# Patient Record
Sex: FEMALE | Race: WHITE | NOT HISPANIC OR LATINO | Employment: PART TIME | URBAN - NONMETROPOLITAN AREA
[De-identification: names, ages, dates, MRNs, and addresses within clinical notes are randomized per-mention and may not be internally consistent; named-entity substitution may affect disease eponyms.]

---

## 2017-01-31 ENCOUNTER — OFFICE VISIT (OUTPATIENT)
Dept: NEUROSURGERY | Facility: CLINIC | Age: 32
End: 2017-01-31

## 2017-01-31 DIAGNOSIS — M51.26 LUMBAR DISC HERNIATION: ICD-10-CM

## 2017-01-31 DIAGNOSIS — M51.36 DDD (DEGENERATIVE DISC DISEASE), LUMBAR: Primary | ICD-10-CM

## 2017-01-31 PROCEDURE — 99243 OFF/OP CNSLTJ NEW/EST LOW 30: CPT | Performed by: NEUROLOGICAL SURGERY

## 2017-01-31 RX ORDER — NAPROXEN 500 MG/1
TABLET ORAL
Refills: 0 | COMMUNITY
Start: 2016-11-30

## 2017-01-31 RX ORDER — CITALOPRAM 10 MG/1
TABLET ORAL
Refills: 1 | COMMUNITY
Start: 2016-12-19

## 2017-01-31 RX ORDER — HYDROXYZINE PAMOATE 25 MG/1
CAPSULE ORAL
Refills: 0 | COMMUNITY
Start: 2016-11-04

## 2017-01-31 NOTE — LETTER
January 31, 2017     Morales Springer MD  [address] [place] [address] Rd  [place] [address]    Patient: Ophelia Loza   YOB: 1985   Date of Visit: 1/31/2017       Dear Dr. Gian MD:    Thank you for referring Ophelia Loza to me for evaluation. Below is a copy of my consult note.    If you have questions, please do not hesitate to call me. I look forward to following Ophelia along with you.         Sincerely,        Ghanshyam Judd MD        CC: No Recipients    Subjective   Patient ID: Ophelia Loza is a 31 y.o. female is being seen for consultation today at the request of Morales Springer MD  Chief Complaint: Back pain    History of Present Illness: The patient is a 31-year-old woman who had acute onset of back pain in early December, severe for a couple of weeks, and steadily improving since then.  She was treated with chiropractic therapy and has had some success with the treatment.  She has a long-standing history of about 8 years of chronic back pain with intermittent chiropractic therapy.  Numerous physical activities bother her back.  She does not have a unilateral radicular pain or numbness to either lower extremity.  She works as a .    Review of Radiographic Studies:  Lumbar MRI scan on 12/20/16 shows a markedly narrowed disc space at L4 5 with a left paracentral disc herniation of small size it appears to be relatively acute and probably the source of her more recent pain.  There is a mild degenerative disc at L3 4.    The following portions of the patient's history were reviewed, updated as appropriate and approved: allergies, current medications, past family history, past medical history, past social history, past surgical history, review of systems and problem list.  Review of Systems   Constitutional: Negative for activity change, appetite change, chills, diaphoresis, fatigue, fever and unexpected weight change.   HENT: Negative for congestion, dental problem,  drooling, ear discharge, ear pain, facial swelling, hearing loss, mouth sores, nosebleeds, postnasal drip, rhinorrhea, sinus pressure, sneezing, sore throat, tinnitus, trouble swallowing and voice change.    Eyes: Negative for photophobia, pain, discharge, redness, itching and visual disturbance.   Respiratory: Negative for apnea, cough, choking, chest tightness, shortness of breath, wheezing and stridor.    Cardiovascular: Negative for chest pain, palpitations and leg swelling.   Gastrointestinal: Negative for abdominal distention, abdominal pain, anal bleeding, blood in stool, constipation, diarrhea, nausea, rectal pain and vomiting.   Endocrine: Negative for cold intolerance, heat intolerance, polydipsia, polyphagia and polyuria.   Genitourinary: Negative for decreased urine volume, difficulty urinating, dysuria, enuresis, flank pain, frequency, genital sores, hematuria and urgency.   Musculoskeletal: Positive for arthralgias, back pain and myalgias. Negative for gait problem, joint swelling, neck pain and neck stiffness.   Skin: Negative for color change, pallor, rash and wound.   Allergic/Immunologic: Negative for environmental allergies, food allergies and immunocompromised state.   Neurological: Negative for dizziness, tremors, seizures, syncope, facial asymmetry, speech difficulty, weakness, light-headedness, numbness and headaches.   Hematological: Negative for adenopathy. Does not bruise/bleed easily.   Psychiatric/Behavioral: Negative for agitation, behavioral problems, confusion, decreased concentration, dysphoric mood, hallucinations, self-injury, sleep disturbance and suicidal ideas. The patient is not nervous/anxious and is not hyperactive.    All other systems reviewed and are negative.      Objective     NEUROLOGICAL EXAMINATION:      MENTAL STATUS:  Alert and oriented.  Speech intact.  Recent and remote memory intact.      CRANIAL NERVES:  Cranial nerve II:  Visual fields are full to  confrontation.  Cranial nerves III, IV and VI:  PERRLADC.  Extraocular movements are intact.  Nystagmus is not present.  Cranial nerve V:  Facial sensation is intact to light touch.  Cranial nerve VII:  Muscles of facial expression reveal no asymmetry.  Cranial nerve VIII:  Hearing is intact to finger rub bilaterally.  Cranial nerves IX and X:  Palate elevates symmetrically.  Cranial nerve XI:  Shoulder shrug is intact.  Cranial nerve XII:  Tongue is midline without evidence of atrophy or fasciculation.    MUSCULOSKELETAL:  SLR negative. Dale's test negative.    MOTOR:  Deltoid, biceps, triceps, and  strength intact.  No hand atrophy.  Hip flexion, knee extension, ankle dorsiflexion and plantar flexion intact. No tremor, spasticity, ataxia, or dysmetria.    SENSATION: Intact to touch upper and lower extremities.  Position sense intact.    REFLEXES:  DTR intact and symmetrical in upper and lower extremities. Negative Babinski bilaterally    Assessment   Left paracentral disc herniation L4 5 with resolving back pain syndrome and no clinically symptomatic radiculopathy.       Plan   Her treatment has been adequate and appropriate for her condition.  Surgery is not necessary.  Recurrences of pain can be expected with this degenerative disc.  Prevention with proper back mechanics would be helpful.        Ghanshyam Judd MD

## 2017-01-31 NOTE — PROGRESS NOTES
Subjective   Patient ID: Ophelia Loza is a 31 y.o. female is being seen for consultation today at the request of Morales Springer MD  Chief Complaint: Back pain    History of Present Illness: The patient is a 31-year-old woman who had acute onset of back pain in early December, severe for a couple of weeks, and steadily improving since then.  She was treated with chiropractic therapy and has had some success with the treatment.  She has a long-standing history of about 8 years of chronic back pain with intermittent chiropractic therapy.  Numerous physical activities bother her back.  She does not have a unilateral radicular pain or numbness to either lower extremity.  She works as a .    Review of Radiographic Studies:  Lumbar MRI scan on 12/20/16 shows a markedly narrowed disc space at L4 5 with a left paracentral disc herniation of small size it appears to be relatively acute and probably the source of her more recent pain.  There is a mild degenerative disc at L3 4.    The following portions of the patient's history were reviewed, updated as appropriate and approved: allergies, current medications, past family history, past medical history, past social history, past surgical history, review of systems and problem list.  Review of Systems   Constitutional: Negative for activity change, appetite change, chills, diaphoresis, fatigue, fever and unexpected weight change.   HENT: Negative for congestion, dental problem, drooling, ear discharge, ear pain, facial swelling, hearing loss, mouth sores, nosebleeds, postnasal drip, rhinorrhea, sinus pressure, sneezing, sore throat, tinnitus, trouble swallowing and voice change.    Eyes: Negative for photophobia, pain, discharge, redness, itching and visual disturbance.   Respiratory: Negative for apnea, cough, choking, chest tightness, shortness of breath, wheezing and stridor.    Cardiovascular: Negative for chest pain, palpitations and leg swelling.    Gastrointestinal: Negative for abdominal distention, abdominal pain, anal bleeding, blood in stool, constipation, diarrhea, nausea, rectal pain and vomiting.   Endocrine: Negative for cold intolerance, heat intolerance, polydipsia, polyphagia and polyuria.   Genitourinary: Negative for decreased urine volume, difficulty urinating, dysuria, enuresis, flank pain, frequency, genital sores, hematuria and urgency.   Musculoskeletal: Positive for arthralgias, back pain and myalgias. Negative for gait problem, joint swelling, neck pain and neck stiffness.   Skin: Negative for color change, pallor, rash and wound.   Allergic/Immunologic: Negative for environmental allergies, food allergies and immunocompromised state.   Neurological: Negative for dizziness, tremors, seizures, syncope, facial asymmetry, speech difficulty, weakness, light-headedness, numbness and headaches.   Hematological: Negative for adenopathy. Does not bruise/bleed easily.   Psychiatric/Behavioral: Negative for agitation, behavioral problems, confusion, decreased concentration, dysphoric mood, hallucinations, self-injury, sleep disturbance and suicidal ideas. The patient is not nervous/anxious and is not hyperactive.    All other systems reviewed and are negative.      Objective     NEUROLOGICAL EXAMINATION:      MENTAL STATUS:  Alert and oriented.  Speech intact.  Recent and remote memory intact.      CRANIAL NERVES:  Cranial nerve II:  Visual fields are full to confrontation.  Cranial nerves III, IV and VI:  PERRLADC.  Extraocular movements are intact.  Nystagmus is not present.  Cranial nerve V:  Facial sensation is intact to light touch.  Cranial nerve VII:  Muscles of facial expression reveal no asymmetry.  Cranial nerve VIII:  Hearing is intact to finger rub bilaterally.  Cranial nerves IX and X:  Palate elevates symmetrically.  Cranial nerve XI:  Shoulder shrug is intact.  Cranial nerve XII:  Tongue is midline without evidence of atrophy or  fasciculation.    MUSCULOSKELETAL:  SLR negative. Dale's test negative.    MOTOR:  Deltoid, biceps, triceps, and  strength intact.  No hand atrophy.  Hip flexion, knee extension, ankle dorsiflexion and plantar flexion intact. No tremor, spasticity, ataxia, or dysmetria.    SENSATION: Intact to touch upper and lower extremities.  Position sense intact.    REFLEXES:  DTR intact and symmetrical in upper and lower extremities. Negative Babinski bilaterally    Assessment   Left paracentral disc herniation L4 5 with resolving back pain syndrome and no clinically symptomatic radiculopathy.       Plan   Her treatment has been adequate and appropriate for her condition.  Surgery is not necessary.  Recurrences of pain can be expected with this degenerative disc.  Prevention with proper back mechanics would be helpful.        Ghanshyam Judd MD

## 2017-01-31 NOTE — PATIENT INSTRUCTIONS
Degenerative Disk Disease  Degenerative disk disease is a condition caused by the changes that occur in spinal disks as you grow older. Spinal disks are soft and compressible disks located between the bones of your spine (vertebrae). These disks act like shock absorbers. Degenerative disk disease can affect the whole spine. However, the neck and lower back are most commonly affected. Many changes can occur in the spinal disks with aging, such as:  · The spinal disks may dry and shrink.  · Small tears may occur in the tough, outer covering of the disk (annulus).  · The disk space may become smaller due to loss of water.  · Abnormal growths in the bone (spurs) may occur. This can put pressure on the nerve roots exiting the spinal canal, causing pain.  · The spinal canal may become narrowed.  RISK FACTORS   · Being overweight.  · Having a family history of degenerative disk disease.  · Smoking.  · There is increased risk if you are doing heavy lifting or have a sudden injury.  SIGNS AND SYMPTOMS   Symptoms vary from person to person and may include:  · Pain that varies in intensity. Some people have no pain, while others have severe pain. The location of the pain depends on the part of your backbone that is affected.  ¨ You will have neck or arm pain if a disk in the neck area is affected.  ¨ You will have pain in your back, buttocks, or legs if a disk in the lower back is affected.  · Pain that becomes worse while bending, reaching up, or with twisting movements.  · Pain that may start gradually and then get worse as time passes. It may also start after a major or minor injury.  · Numbness or tingling in the arms or legs.  DIAGNOSIS   Your health care provider will ask you about your symptoms and about activities or habits that may cause the pain. He or she may also ask about any injuries, diseases, or treatments you have had. Your health care provider will examine you to check for the range of movement that is  possible in the affected area, to check for strength in your extremities, and to check for sensation in the areas of the arms and legs supplied by different nerve roots. You may also have:   · An X-ray of the spine.  · Other imaging tests, such as MRI.  TREATMENT   Your health care provider will advise you on the best plan for treatment. Treatment may include:  · Medicines.  · Rehabilitation exercises.  HOME CARE INSTRUCTIONS   · Follow proper lifting and walking techniques as advised by your health care provider.  · Maintain good posture.  · Exercise regularly as advised by your health care provider.  · Perform relaxation exercises.  · Change your sitting, standing, and sleeping habits as advised by your health care provider.  · Change positions frequently.  · Lose weight or maintain a healthy weight as advised by your health care provider.  · Do not use any tobacco products, including cigarettes, chewing tobacco, or electronic cigarettes. If you need help quitting, ask your health care provider.  · Wear supportive footwear.  · Take medicines only as directed by your health care provider.  SEEK MEDICAL CARE IF:   · Your pain does not go away within 1-4 weeks.  · You have significant appetite or weight loss.  SEEK IMMEDIATE MEDICAL CARE IF:   · Your pain is severe.  · You notice weakness in your arms, hands, or legs.  · You begin to lose control of your bladder or bowel movements.  · You have fevers or night sweats.  MAKE SURE YOU:   · Understand these instructions.  · Will watch your condition.  · Will get help right away if you are not doing well or get worse.     This information is not intended to replace advice given to you by your health care provider. Make sure you discuss any questions you have with your health care provider.     Document Released: 10/14/2008 Document Revised: 01/08/2016 Document Reviewed: 04/21/2015  [employer] Education ©2016 [employer]    Herniated Disk With  Rehab  Between each vertebrae of the spine exists a disk. These disks contain a jelly-like material that helps cushion the spinal column. Occasionally, damage to the supportive ligaments of the vertebrae causes a disk to shift from its normal alignment and place pressure on surrounding structures, such as the spinal cord. This is called a herniated (ruptured) disk.  SYMPTOMS   · Pain in the back, that often affects one side.  · Pain that gets worse with movement, sneezing, coughing, or straining.  · Muscle spasms in the back.  · Pain, numbness, or weakness affecting one arm or leg (depending on whether injury is in the neck or low back).  · Muscle loss (if the condition has become chronic).  · Loss of stool (bowel) or urine (bladder) function.  CAUSES   Herniated disks result when a disk becomes weak. The disk eventually ruptures and places pressure on the spinal cord. Herniated disks may occur from sudden injury (acute trauma) such as heavy labor, or from ongoing (chronic) stress, such as obesity.   RISK INCREASES WITH:  · Sports that involve downward or twisting pressure on the neck or spine (football, weightlifting, horseback riding competition, bowling, tennis, jogging, track, racquetball, gymnastics).  · Poor strength and flexibility.  · Failure to warm up properly before activity.  · Family history of low back pain or disk disorders.  · Previous back surgery (especially fusion).  · Preexisting forward displacement of a vertebra (spondylolisthesis).  · Poor technique when lifting.  · Prolonged sitting, especially with poor posture.  PREVENTION  · Learn and use proper technique when sitting or lifting.  · Warm up and stretch properly before activity.  · Maintain physical fitness:    Strength, flexibility, and endurance.    Cardiovascular fitness.    Maintain a healthy body weight.  · If previously injured, avoid any intense physical activity that requires twisting of the body under uncontrollable  conditions.  PROGNOSIS   If treated properly, herniated disks are usually curable within 6 weeks. Sometimes, surgery is required.   RELATED COMPLICATIONS   · Permanent numbness, weakness, or paralysis and muscle loss.  · Chronic back pain.  · Loss of bowel or bladder function.  · Decreased sexual function.  · Risks of surgery: infection, bleeding, injury to nerves (persistent or increased numbness, weakness, or paralysis), persistent back pain, and spinal headache.  TREATMENT  Treatment first involves resting from any aggravating activities and the use of ice and medicine to reduce pain and inflammation. As muscle spasms begin to decrease, it is important to perform strengthening and stretching exercises of the back muscles. These will help teach and reinforce proper body posture. These exercises may be performed at home, or with a therapist. A therapist may complete a further evaluation and recommend additional treatments, such as ultrasound, traction (for herniated disks of the neck), a cervical collar (for herniated disks of the neck), or a corset or back brace (for herniated disks of the low back). Prolonged rest may do more harm than good. Your therapist will teach you proper techniques for performing simple activities, such as lifting an object off the floor or using proper posture while sitting. At night, it is advised that you sleep on your back, on a firm mattress, and place a pillow under your knees. Your caregiver may recommend oral steroids or an injection of corticosteroids in the space around the spinal cord (epidural space) in order to reduce pain and inflammation. For severe cases, surgery is recommended.  MEDICATION   · If pain medicine is needed, nonsteroidal anti-inflammatory medicines (aspirin and ibuprofen), or other minor pain relievers (acetaminophen), are often advised.  · Do not take pain medicine for 7 days before surgery.  · Prescription pain relievers may be given if your caregiver thinks  they are needed. Use only as directed and only as much as you need.  · Ointments applied to the skin may be helpful.  · Corticosteroid injections may be given. These injections should be reserved for the most serious cases, as they can only be given a certain number of times.  · Oral steroids may be given to reduce inflammation, although not usually for severe (acute) injuries.  HEAT AND COLD  · Cold treatment (icing) relieves pain and reduces inflammation. Cold treatment should be applied for 10 to 15 minutes every 2 to 3 hours, and immediately after activity that aggravates your symptoms. Use ice packs or an ice massage.  · Heat treatment may be used before performing stretching and strengthening activities prescribed by your caregiver, physical therapist, or . Use a heat pack or a warm water soak.  SEEK MEDICAL CARE IF:   · Symptoms get worse or do not improve in 2 to 4 weeks, despite treatment.  · You develop loss of bowel or bladder function.  · New, unexplained symptoms develop. (Drugs used in treatment may produce side effects.)  EXERCISES   RANGE OF MOTION (ROM) AND STRETCHING EXERCISES - Herniated Disk (Ruptured Disk)  Most people with low back pain will find that their symptoms get worse with excessive bending forward (flexion) or arching at the low back (extension). The exercises that will help resolve your symptoms will focus on the opposite motion. Your physician, physical therapist or  will help you determine which exercises will be most helpful to resolve your low back pain. Do not complete any exercises without first consulting with your caregiver. Discontinue any exercises that make your symptoms worse, until you speak to your caregiver. If you have pain, numbness or tingling that travels down into your buttocks, leg or foot, the goal of this therapy is for these symptoms to move closer to your back and to eventually go away. Sometimes, these leg symptoms will get  better, but your low back pain may get worse. This is typically an indication of progress in your rehabilitation. Be sure to be very alert to any changes in your symptoms and to the activities you have done in the 24 hours prior to the change. Sharing this information with your caregiver will allow him or her to best treat your condition.  These exercises may help you when beginning to rehabilitate your injury. Your symptoms may go away with or without further involvement from your physician, physical therapist or . While completing these exercises, remember:   · Restoring tissue flexibility helps normal motion to return to the joints. This allows healthier, less painful movement and activity.  · An effective stretch should be held for at least 30 seconds.  · A stretch should never be painful. You should only feel a gentle lengthening or release in the stretched tissue.  FLEXION RANGE OF MOTION AND STRETCHING EXERCISES:  STRETCH - Flexion, Single Knee to Chest  · Lie on a firm bed or floor, with both legs extended in front of you.  · Keeping one leg in contact with the floor, bring your opposite knee to your chest. Hold your leg in place by either grabbing behind your thigh or at your knee.  · Pull until you feel a gentle stretch in your low back. Hold for __________ seconds.  · Slowly release your grasp and repeat the exercise with the opposite side.  Repeat __________ times. Complete this exercise __________ times per day.   STRETCH - Flexion, Double Knee to Chest   · Lie on a firm bed or floor, with both legs extended in front of you.  · Keeping one leg in contact with the floor, bring your opposite knee to your chest.  · Tense your stomach muscles to support your back and then lift your other knee to your chest. Hold your legs in place by either grabbing behind your thighs or at your knees.  · Pull both knees toward your chest until you feel a gentle stretch in your low back. Hold for __________  seconds.  · Tense your stomach muscles and slowly return one leg at a time to the floor.  Repeat __________ times. Complete this exercise __________ times per day.   STRETCH - Low Trunk Rotation  · Lie on a firm bed or floor. Keeping your legs in front of you, bend your knees so they are both pointed toward the ceiling and your feet are flat on the floor.  · Extend your arms out to the side. This will stabilize your upper body by keeping your shoulders in contact with the floor.  · Gently and slowly drop both knees together to one side, until you feel a gentle stretch in your low back. Hold for __________ seconds.  · Tense your stomach muscles to support your low back as you bring your knees back to the starting position. Repeat the exercise while dropping both knees to the other side.  Repeat __________ times. Complete this exercise __________ times per day   EXTENSION RANGE OF MOTION AND FLEXIBILITY EXERCISES:  STRETCH - Extension, Prone on Elbows   · Lie on your stomach on the floor. (A bed will be too soft.) Place your palms about shoulder width apart.  · Place your elbows under your shoulders. If this is too painful, stack pillows under your chest.  · Allow your body to relax so that your hips drop lower and make contact more completely with the floor.  · Hold this position for __________ seconds.  · Slowly return to lying flat on the floor.  Repeat __________ times. Complete this exercise __________ times per day.   RANGE OF MOTION - Extension, Prone Press Ups   · Lie on your stomach on the floor. (A bed will be too soft.) Place your palms about shoulder width apart and at the height of your head.  · Keeping your back as relaxed as possible, slowly straighten your elbows while keeping your hips on the floor. You may adjust the placement of your hands to maximize your comfort. As you gain motion, your hands will come more underneath your shoulders.  · Hold this position for __________ seconds.  · Slowly return  "to lying flat on the floor.  Repeat __________ times. Complete this exercise __________ times per day.   RANGE OF MOTION- Quadruped, Neutral Spine   · Assume a hands and knees position on a firm surface. Keep your hands under your shoulders and your knees under your hips. You may place padding under your knees for comfort.  · Drop your head and point your tail bone toward the ground below you. This will round out your low back like an angry cat. Hold this position for __________ seconds.  · Slowly lift your head and release your tail bone so that your back sags into a large arch, like an old horse.  · Hold this position for __________ seconds.  · Repeat this until you feel limber in your low back.  · Now, find your \"sweet spot.\" This will be the most comfortable position somewhere between the two previous positions. This is your neutral spine. Once you have found this position, tense your stomach muscles to support your low back.  · Hold this position for __________ seconds.  Repeat __________ times. Complete this exercise __________ times per day.   STRENGTHENING EXERCISES - Herniated Disk (Ruptured Disk)  These exercises may help you when beginning to rehabilitate your injury. These exercises should be done near your \"sweet spot.\" This is the neutral, low-back arch, somewhere between fully rounded and fully arched, that is your least painful position. When performed in this safe range of motion, these exercises can be used for people who have either a flexion or extension based injury. These exercises may resolve your symptoms with or without further involvement from your physician, physical therapist or . While completing these exercises, remember:   · Muscles can gain both the endurance and the strength needed for everyday activities through controlled exercises.  · Complete these exercises as instructed by your physician, physical therapist or . Increase the resistance and " repetitions only as guided.  · You may experience muscle soreness or fatigue, but the pain or discomfort you are trying to eliminate should never worsen during these exercises. If this pain does get worse, stop and make sure you are following the directions exactly. If the pain is still present after adjustments, discontinue the exercise until you can discuss the trouble with your clinician.  STRENGTHENING - Deep Abdominals, Pelvic Tilt   · Lie on a firm bed or floor. Keeping your legs in front of you, bend your knees so they are both pointed toward the ceiling and your feet are flat on the floor.  · Tense your lower abdominal muscles to press your low back into the floor. This motion will rotate your pelvis so that your tail bone is scooping upwards rather than pointing at your feet or into the floor.  · With a gentle tension and even breathing, hold this position for __________ seconds.  Repeat __________ times. Complete this exercise __________ times per day.   STRENGTHENING - Abdominals, Crunches   · Lie on a firm bed or floor. Keeping your legs in front of you, bend your knees so they are both pointed toward the ceiling and your feet are flat on the floor. Cross your arms over your chest.  · Slightly tip your chin down without bending your neck.  · Tense your abdominals and slowly lift your trunk high enough so that your shoulder blades are just off the floor. Lifting higher can put too much stress on the low back and does not further strengthen your abdominal muscles.  · With control, return to the starting position.  Repeat __________ times. Complete this exercise __________ times per day.   STRENGTHENING - Quadruped, Opposite UE/LE Lift  · Assume a hands and knees position on a firm surface. Keep your hands under your shoulders and your knees under your hips. You may place padding under your knees for comfort.  · Find your neutral spine and gently tense your abdominal muscles so that you can maintain this  position. Your shoulders and hips should form a rectangle that is parallel with the floor and is not twisted.  · Keeping your trunk steady, lift your right hand no higher than your shoulder. Then lift your left leg no higher than your hip. Make sure you are not holding your breath. Hold this position for __________ seconds.  · Continuing to keep your abdominal muscles tense and your back steady, slowly return to your starting position. Repeat with the opposite arm and leg.  Repeat __________ times. Complete this exercise __________ times per day.   STRENGTHENING - Lower Abdominals, Double Knee Lift  · Lie on a firm bed or floor. Keeping your legs in front of you, bend your knees so they are both pointed toward the ceiling and your feet are flat on the floor.  · Tense your abdominal muscles to brace your low back and slowly lift both of your knees until they come over your hips. Be certain not to hold your breath.  · Hold for __________ seconds. Using your abdominal muscles, return to the starting position in a slow and controlled manner.  Repeat __________ times. Complete this exercise __________ times per day.   POSTURE AND BODY MECHANICS CONSIDERATIONS - Herniated Disc (Ruptured Disk)  Keeping correct posture when sitting, standing or completing your activities will reduce the stress put on different body tissues, allowing injured tissues a chance to heal and limiting painful experiences. The following are general guidelines for improved posture. Your physician or physical therapist will provide you with any instructions specific to your needs. While reading these guidelines, remember:  · The exercises prescribed by your provider will help you build the flexibility and strength to maintain correct postures.  · The correct posture provides the best environment for your joints to work. All of your joints have less wear and tear when properly supported by a spine with good posture. This means you will experience a  healthier, less painful body.  · Correct posture must be practiced with all of your activities, especially prolonged sitting and standing. Correct posture is as important when doing repetitive low-stress activities (typing) as it is when doing a single heavy-load activity (lifting).  RESTING POSITIONS  Consider which positions are most painful for you when choosing a resting position. If you have pain with flexion-based activities (sitting, bending, stooping, squatting), choose a position that allows you to rest in a less flexed posture. You would want to avoid curling into a fetal position on your side. If your pain gets worse with extension-based activities (prolonged standing, working overhead), avoid resting in an extended position such as sleeping on your stomach. Most people will find more comfort when they rest with their spine in a more neutral position, neither too rounded nor too arched. Lying on a non-sagging bed on your side, with a pillow between your knees, or on your back with a pillow under your knees will often provide some relief. Keep in mind, being in any one position for a prolonged period of time, no matter how correct your posture, can still lead to stiffness.  PROPER SITTING POSTURE  In order to minimize stress and discomfort on your spine, you must sit with correct posture. Sitting with good posture should be effortless for a healthy body. Returning to good posture is a gradual process. Many people can work toward this most comfortably by using various supports until they have the flexibility and strength to maintain this posture on their own.  When sitting with proper posture, your ears will fall over your shoulders and your shoulders will fall over your hips. You should use the back of the chair to support your upper back. Your low back will be in a neutral position, just slightly arched. You may place a small pillow or folded towel at the base of your low back for support.   When working  at a desk, create an environment that supports good, upright posture. Without extra support, muscles tire, which leads to excessive strain on joints and other tissues. Keep these recommendations in mind.  CHAIR  · A chair should be able to slide under your desk when your back makes contact with the back of the chair. This allows you to work closely.  · The chair's height should allow your eyes to be level with the upper part of your monitor and your hands to be slightly lower than your elbows.  BODY POSITION  · Your feet should make contact with the floor. If this is not possible, use a foot rest.  · Keep your ears over your shoulders. This will reduce stress on your neck and low back.  INCORRECT SITTING POSTURES   If you are feeling tired and unable to assume a healthy sitting posture, do not slouch or slump. This puts excessive strain on your back tissues, causing more damage and pain. Healthier options include:  · Using more support, like a lumbar pillow.  · Switching tasks, to something that requires you to be upright or walking.  · Talking a brief walk.  · Lying down to rest in a neutral-spine position.  PROLONGED STANDING WHILE SLIGHTLY LEANING FORWARD   When completing a task that requires you to lean forward while standing in one place for a long time, place either foot up on a stationary 2-4 inch high object, to help maintain the best posture. When both feet are on the ground, the low back tends to lose its slight inward curve. If this curve flattens (or becomes too large), the back and your other joints will experience too much stress, tire more quickly and can cause pain.  CORRECT STANDING POSTURES  Proper standing posture should be assumed with all daily activities, even if they only take a few moments, like when brushing your teeth. As in sitting, your ears should fall over your shoulders and your shoulders should fall over your hips. You should keep a slight tension in your abdominal muscles to brace  your spine. Your tailbone should point down to the ground, not behind your body, resulting in an over-extended swayback posture.   INCORRECT STANDING POSTURES   Common incorrect standing postures include a forward head, locked knees or an excessive swayback.  WALKING  Walk with an upright posture. Your ears, shoulders and hips should all line-up.  PROLONGED ACTIVITY IN A FLEXED POSITION  When completing a task that requires you to bend forward at your waist or lean over a low surface, try finding a way to stabilize 3 out of 4 of your limbs. You can place a hand or elbow on your thigh, or rest a knee on the surface you are reaching across. This will provide you more stability so that your muscles do not tire as quickly. By keeping your knees relaxed, or slightly bent, you will also reduce stress across your low back.  CORRECT LIFTING TECHNIQUES  DO :   · Assume a wide stance. This will provide you more stability and the opportunity to get as close as possible to the object you are lifting.  · Tense your abdominals to brace your spine. Then, bend at the knees and hips. Keeping your back locked in a neutral-spine position, lift using your leg muscles. Lift with your legs, keeping your back straight.  · Test the weight of unknown objects before attempting to lift them.  · Try to keep your elbows down by your sides, in order get the best strength from your shoulders when carrying an object.  · Always ask for help when lifting heavy or awkward objects.  INCORRECT LIFTING TECHNIQUES  DO NOT:   · Lock your knees when lifting, even if it is a small object.  · Bend and twist. Pivot at your feet or move your feet when needing to change directions.  · Assume that you can safely  even a paper clip, without proper posture.     This information is not intended to replace advice given to you by your health care provider. Make sure you discuss any questions you have with your health care provider.     Document Released:  12/18/2006 Document Revised: 01/08/2016 Document Reviewed: 04/01/2010  [employer]vier Interactive Patient Education ©2016 [employer]      If you have any questions in regards to your visit with  today, please do not hesitate to contact our office. Ask to speak with a CMA for any clinical questions you may have.    Sarah Sparrow, [facility]    [phone]

## 2020-12-07 NOTE — PROGRESS NOTES
Patient: Ophelia Loza    YOB: 1985    Date: 12/09/2020    Primary Care Provider: Morales Springer MD    Chief Complaint   Patient presents with   • Abdominal Pain     RUQ pain       SUBJECTIVE:    History of present illness:  I saw the patient in the office today as a consultation for evaluation and treatment of gallstones. She states she has had sharp RUQ pain that radiates to her back. She states this has been going on for two weeks.  Patient has had 1 or 2 attacks today, pain mainly right upper quadrant radiates to her back and right shoulder.  Pain starts 1-1/2 to 2 hours after eating and last 1 to 2 hours.  No fever or chills.  No diarrhea or change in bowel habits.  No significant heartburn symptoms.    The following portions of the patient's history were reviewed and updated as appropriate: allergies, current medications, past family history, past medical history, past social history, past surgical history and problem list.    Review of Systems   Constitutional: Negative for chills, fever and unexpected weight change.   HENT: Negative for trouble swallowing and voice change.    Eyes: Negative for visual disturbance.   Respiratory: Negative for apnea, cough, chest tightness and wheezing.    Cardiovascular: Negative for chest pain, palpitations and leg swelling.   Gastrointestinal: Negative for abdominal distention, abdominal pain, anal bleeding, blood in stool, constipation, diarrhea, nausea, rectal pain and vomiting.   Endocrine: Negative for cold intolerance and heat intolerance.   Genitourinary: Negative for difficulty urinating, dysuria, flank pain and hematuria.   Musculoskeletal: Negative for back pain, gait problem and joint swelling.   Skin: Negative for color change, rash and wound.   Neurological: Negative for dizziness, syncope, speech difficulty, weakness, numbness and headaches.   Hematological: Negative for adenopathy. Does not bruise/bleed easily.  "  Psychiatric/Behavioral: Negative for confusion. The patient is not nervous/anxious.        History:  Past Medical History:   Diagnosis Date   • Low back pain        Past Surgical History:   Procedure Laterality Date   • TUBAL ABDOMINAL LIGATION     • WISDOM TOOTH EXTRACTION         Family History   Problem Relation Age of Onset   • Cancer Maternal Grandmother        Social History     Tobacco Use   • Smoking status: Never Smoker   • Smokeless tobacco: Never Used   Substance Use Topics   • Alcohol use: No   • Drug use: No       Allergies:  Allergies   Allergen Reactions   • Penicillins Unknown - High Severity       Medications:    Current Outpatient Medications:   •  citalopram (CeleXA) 10 MG tablet, , Disp: , Rfl: 1  •  hydrOXYzine (VISTARIL) 25 MG capsule, , Disp: , Rfl: 0  •  naproxen (NAPROSYN) 500 MG tablet, , Disp: , Rfl: 0    OBJECTIVE:    Vital Signs:   Vitals:    12/09/20 0832   BP: 118/70   Pulse: 103   Temp: 98.7 °F (37.1 °C)   SpO2: 99%   Weight: 85.7 kg (189 lb)   Height: 162.6 cm (64\")       Physical Exam:   General Appearance:    Alert, cooperative, in no acute distress   Head:    Normocephalic, without obvious abnormality, atraumatic   Eyes:            Lids and lashes normal, conjunctivae and sclerae normal, no   icterus, no pallor, corneas clear, PERRLA   Ears:    Ears appear intact with no abnormalities noted   Throat:   No oral lesions, no thrush, oral mucosa moist   Neck:   No adenopathy, supple, trachea midline, no thyromegaly, no   carotid bruit, no JVD   Lungs:     Clear to auscultation,respirations regular, even and                  unlabored    Heart:    Regular rhythm and normal rate, normal S1 and S2, no            murmur   Abdomen:     no masses, no organomegaly, soft with moderate tenderness right upper quadrant.  No rebound or guarding.  No abdominal wall masses or hernias.   Extremities:   Moves all extremities well, no edema, no cyanosis, no             redness   Pulses:   Pulses " palpable and equal bilaterally   Skin:   No bleeding, bruising or rash   Lymph nodes:   No palpable adenopathy   Neurologic:   Cranial nerves 2 - 12 grossly intact, sensation intact      Results Review:   I reviewed the patient's new clinical results.    Review of Systems was reviewed and confirmed as accurate as documented by the MA.    ASSESSMENT/PLAN:    1. Right upper quadrant abdominal pain    2. Calculus of gallbladder without cholecystitis without obstruction        I had a detailed and extensive discussion with the patient in the office and they understand that they need to undergo laparoscopic cholecystectomy with intraoperative cholangiography or possible open cholecystectomy. Full risks and benefits of operative versus nonoperative intervention were discussed with the patient and these included things such as nonresolution of symptoms and possible worsening of symptoms without surgical intervention versus infection, bleeding, open cholecystectomy, common bile duct injury, postoperative biliary leakage, need for drain placement, possible inability to perform cholangiography due to inflammation, postoperative abscess, etc with surgical intervention. The patient understands, agrees, and wishes to proceed with the surgical treatment plan as mentioned above. The patient had no questions for me at the end of the discussion.  I did draw a picture of the anatomy for the patient and used this in my informed consent.  Patient placed on a low-fat diet in interim.  Risk of postop bile leak also discussed and patient agreeable.     I discussed the patients findings and my recommendations with patient.    Electronically signed by Jeancarlos Brandt MD  12/09/20

## 2020-12-09 ENCOUNTER — OFFICE VISIT (OUTPATIENT)
Dept: SURGERY | Facility: CLINIC | Age: 35
End: 2020-12-09

## 2020-12-09 VITALS
DIASTOLIC BLOOD PRESSURE: 70 MMHG | OXYGEN SATURATION: 99 % | SYSTOLIC BLOOD PRESSURE: 118 MMHG | HEART RATE: 103 BPM | HEIGHT: 64 IN | BODY MASS INDEX: 32.27 KG/M2 | WEIGHT: 189 LBS | TEMPERATURE: 98.7 F

## 2020-12-09 DIAGNOSIS — K80.20 CALCULUS OF GALLBLADDER WITHOUT CHOLECYSTITIS WITHOUT OBSTRUCTION: ICD-10-CM

## 2020-12-09 DIAGNOSIS — R10.11 RIGHT UPPER QUADRANT ABDOMINAL PAIN: Primary | ICD-10-CM

## 2020-12-09 DIAGNOSIS — Z01.818 PRE-OP TESTING: Primary | ICD-10-CM

## 2020-12-09 PROCEDURE — 99204 OFFICE O/P NEW MOD 45 MIN: CPT | Performed by: SURGERY

## 2020-12-12 ENCOUNTER — LAB (OUTPATIENT)
Dept: LAB | Facility: HOSPITAL | Age: 35
End: 2020-12-12

## 2020-12-12 DIAGNOSIS — Z01.818 PRE-OP TESTING: ICD-10-CM

## 2020-12-12 PROCEDURE — U0004 COV-19 TEST NON-CDC HGH THRU: HCPCS

## 2020-12-12 PROCEDURE — C9803 HOPD COVID-19 SPEC COLLECT: HCPCS

## 2020-12-13 LAB — SARS-COV-2 RNA RESP QL NAA+PROBE: NOT DETECTED

## 2020-12-15 ENCOUNTER — OUTSIDE FACILITY SERVICE (OUTPATIENT)
Dept: SURGERY | Facility: CLINIC | Age: 35
End: 2020-12-15

## 2020-12-15 PROCEDURE — 47563 LAPARO CHOLECYSTECTOMY/GRAPH: CPT | Performed by: SURGERY

## 2020-12-28 ENCOUNTER — OFFICE VISIT (OUTPATIENT)
Dept: SURGERY | Facility: CLINIC | Age: 35
End: 2020-12-28

## 2020-12-28 VITALS
BODY MASS INDEX: 32.44 KG/M2 | WEIGHT: 190 LBS | SYSTOLIC BLOOD PRESSURE: 118 MMHG | DIASTOLIC BLOOD PRESSURE: 62 MMHG | TEMPERATURE: 98.6 F | HEART RATE: 94 BPM | OXYGEN SATURATION: 99 % | HEIGHT: 64 IN

## 2020-12-28 DIAGNOSIS — Z48.89 POSTOPERATIVE VISIT: Primary | ICD-10-CM

## 2020-12-28 PROCEDURE — 99024 POSTOP FOLLOW-UP VISIT: CPT | Performed by: SURGERY

## 2020-12-28 NOTE — PROGRESS NOTES
"Patient: Ophelia Loza    YOB: 1985    Date: 12/28/2020    Primary Care Provider: Morales Springer MD    Chief Complaint   Patient presents with   • Post-op     lap ashutosh       History of present illness:  I saw the patient in the office today as a followup from their recent laparoscopic cholecystectomy.  They state that they have done well and are having no complaints.    Patient's Body mass index is 32.6 kg/m². BMI is above normal parameters. Recommendations include: educational material and exercise counseling.Vital Signs:   Vitals:    12/28/20 1401   BP: 118/62   Pulse: 94   Temp: 98.6 °F (37 °C)   SpO2: 99%   Weight: 86.2 kg (190 lb)   Height: 162.6 cm (64.02\")       Physical Exam:   General Appearance:    Alert, cooperative, in no acute distress   Abdomen:     no masses, no organomegaly, soft non-tender, non-distended, no guarding, wounds are well healed     Assessment / Plan :    1. Postoperative visit        I did discuss the situation with the patient today in the office and they have done well from their recent laparoscopic cholecystectomy.  I have released the patient back to normal activity, they understand that they need to be careful about heavy lifting.  I need to see the patient back in the office only if they are having further problems, they know to call me if they are.    Electronically signed by Jeancarlos Brandt MD  12/28/20                  "

## 2024-08-30 NOTE — MR AVS SNAPSHOT
Ophelia Loza   1/31/2017 1:15 PM   Office Visit    Dept Phone:  [phone]   Encounter #:  [id number]    Provider:  Ghanshyam Judd MD   Department:  [facility]                Your Full Care Plan              Your Updated Medication List          This list is accurate as of: 1/31/17  2:11 PM.  Always use your most recent med list.                citalopram 10 MG tablet   Commonly known as:  CeleXA       hydrOXYzine 25 MG capsule   Commonly known as:  VISTARIL       naproxen 500 MG tablet   Commonly known as:  NAPROSYN               You Were Diagnosed With        Codes Comments    DDD (degenerative disc disease), lumbar    -  Primary ICD-10-CM: M51.36  ICD-9-CM: 722.52     Lumbar disc herniation     ICD-10-CM: M51.26  ICD-9-CM: 722.10       Instructions    Degenerative Disk Disease  Degenerative disk disease is a condition caused by the changes that occur in spinal disks as you grow older. Spinal disks are soft and compressible disks located between the bones of your spine (vertebrae). These disks act like shock absorbers. Degenerative disk disease can affect the whole spine. However, the neck and lower back are most commonly affected. Many changes can occur in the spinal disks with aging, such as:  · The spinal disks may dry and shrink.  · Small tears may occur in the tough, outer covering of the disk (annulus).  · The disk space may become smaller due to loss of water.  · Abnormal growths in the bone (spurs) may occur. This can put pressure on the nerve roots exiting the spinal canal, causing pain.  · The spinal canal may become narrowed.  RISK FACTORS   · Being overweight.  · Having a family history of degenerative disk disease.  · Smoking.  · There is increased risk if you are doing heavy lifting or have a sudden injury.  SIGNS AND SYMPTOMS   Symptoms vary from person to person and may include:  · Pain that varies in intensity. Some  Attempt # 1  Outcome: Left Message   Comment: LVM to schedule a well child check per quality list.   people have no pain, while others have severe pain. The location of the pain depends on the part of your backbone that is affected.  ¨ You will have neck or arm pain if a disk in the neck area is affected.  ¨ You will have pain in your back, buttocks, or legs if a disk in the lower back is affected.  · Pain that becomes worse while bending, reaching up, or with twisting movements.  · Pain that may start gradually and then get worse as time passes. It may also start after a major or minor injury.  · Numbness or tingling in the arms or legs.  DIAGNOSIS   Your health care provider will ask you about your symptoms and about activities or habits that may cause the pain. He or she may also ask about any injuries, diseases, or treatments you have had. Your health care provider will examine you to check for the range of movement that is possible in the affected area, to check for strength in your extremities, and to check for sensation in the areas of the arms and legs supplied by different nerve roots. You may also have:   · An X-ray of the spine.  · Other imaging tests, such as MRI.  TREATMENT   Your health care provider will advise you on the best plan for treatment. Treatment may include:  · Medicines.  · Rehabilitation exercises.  HOME CARE INSTRUCTIONS   · Follow proper lifting and walking techniques as advised by your health care provider.  · Maintain good posture.  · Exercise regularly as advised by your health care provider.  · Perform relaxation exercises.  · Change your sitting, standing, and sleeping habits as advised by your health care provider.  · Change positions frequently.  · Lose weight or maintain a healthy weight as advised by your health care provider.  · Do not use any tobacco products, including cigarettes, chewing tobacco, or electronic cigarettes. If you need help quitting, ask your health care provider.  · Wear supportive footwear.  · Take medicines only as directed by your health care  provider.  SEEK MEDICAL CARE IF:   · Your pain does not go away within 1-4 weeks.  · You have significant appetite or weight loss.  SEEK IMMEDIATE MEDICAL CARE IF:   · Your pain is severe.  · You notice weakness in your arms, hands, or legs.  · You begin to lose control of your bladder or bowel movements.  · You have fevers or night sweats.  MAKE SURE YOU:   · Understand these instructions.  · Will watch your condition.  · Will get help right away if you are not doing well or get worse.     This information is not intended to replace advice given to you by your health care provider. Make sure you discuss any questions you have with your health care provider.     Document Released: 10/14/2008 Document Revised: 01/08/2016 Document Reviewed: 04/21/2015  [employer] Patient Education ©2016 [employer]    Herniated Disk With Rehab  Between each vertebrae of the spine exists a disk. These disks contain a jelly-like material that helps cushion the spinal column. Occasionally, damage to the supportive ligaments of the vertebrae causes a disk to shift from its normal alignment and place pressure on surrounding structures, such as the spinal cord. This is called a herniated (ruptured) disk.  SYMPTOMS   · Pain in the back, that often affects one side.  · Pain that gets worse with movement, sneezing, coughing, or straining.  · Muscle spasms in the back.  · Pain, numbness, or weakness affecting one arm or leg (depending on whether injury is in the neck or low back).  · Muscle loss (if the condition has become chronic).  · Loss of stool (bowel) or urine (bladder) function.  CAUSES   Herniated disks result when a disk becomes weak. The disk eventually ruptures and places pressure on the spinal cord. Herniated disks may occur from sudden injury (acute trauma) such as heavy labor, or from ongoing (chronic) stress, such as obesity.   RISK INCREASES WITH:  · Sports that involve downward or twisting pressure on the neck or  spine (football, weightlifting, horseback riding competition, bowling, tennis, jogging, track, racquetball, gymnastics).  · Poor strength and flexibility.  · Failure to warm up properly before activity.  · Family history of low back pain or disk disorders.  · Previous back surgery (especially fusion).  · Preexisting forward displacement of a vertebra (spondylolisthesis).  · Poor technique when lifting.  · Prolonged sitting, especially with poor posture.  PREVENTION  · Learn and use proper technique when sitting or lifting.  · Warm up and stretch properly before activity.  · Maintain physical fitness:    Strength, flexibility, and endurance.    Cardiovascular fitness.    Maintain a healthy body weight.  · If previously injured, avoid any intense physical activity that requires twisting of the body under uncontrollable conditions.  PROGNOSIS   If treated properly, herniated disks are usually curable within 6 weeks. Sometimes, surgery is required.   RELATED COMPLICATIONS   · Permanent numbness, weakness, or paralysis and muscle loss.  · Chronic back pain.  · Loss of bowel or bladder function.  · Decreased sexual function.  · Risks of surgery: infection, bleeding, injury to nerves (persistent or increased numbness, weakness, or paralysis), persistent back pain, and spinal headache.  TREATMENT  Treatment first involves resting from any aggravating activities and the use of ice and medicine to reduce pain and inflammation. As muscle spasms begin to decrease, it is important to perform strengthening and stretching exercises of the back muscles. These will help teach and reinforce proper body posture. These exercises may be performed at home, or with a therapist. A therapist may complete a further evaluation and recommend additional treatments, such as ultrasound, traction (for herniated disks of the neck), a cervical collar (for herniated disks of the neck), or a corset or back brace (for herniated disks of the low back).  Prolonged rest may do more harm than good. Your therapist will teach you proper techniques for performing simple activities, such as lifting an object off the floor or using proper posture while sitting. At night, it is advised that you sleep on your back, on a firm mattress, and place a pillow under your knees. Your caregiver may recommend oral steroids or an injection of corticosteroids in the space around the spinal cord (epidural space) in order to reduce pain and inflammation. For severe cases, surgery is recommended.  MEDICATION   · If pain medicine is needed, nonsteroidal anti-inflammatory medicines (aspirin and ibuprofen), or other minor pain relievers (acetaminophen), are often advised.  · Do not take pain medicine for 7 days before surgery.  · Prescription pain relievers may be given if your caregiver thinks they are needed. Use only as directed and only as much as you need.  · Ointments applied to the skin may be helpful.  · Corticosteroid injections may be given. These injections should be reserved for the most serious cases, as they can only be given a certain number of times.  · Oral steroids may be given to reduce inflammation, although not usually for severe (acute) injuries.  HEAT AND COLD  · Cold treatment (icing) relieves pain and reduces inflammation. Cold treatment should be applied for 10 to 15 minutes every 2 to 3 hours, and immediately after activity that aggravates your symptoms. Use ice packs or an ice massage.  · Heat treatment may be used before performing stretching and strengthening activities prescribed by your caregiver, physical therapist, or . Use a heat pack or a warm water soak.  SEEK MEDICAL CARE IF:   · Symptoms get worse or do not improve in 2 to 4 weeks, despite treatment.  · You develop loss of bowel or bladder function.  · New, unexplained symptoms develop. (Drugs used in treatment may produce side effects.)  EXERCISES   RANGE OF MOTION (ROM) AND STRETCHING  EXERCISES - Herniated Disk (Ruptured Disk)  Most people with low back pain will find that their symptoms get worse with excessive bending forward (flexion) or arching at the low back (extension). The exercises that will help resolve your symptoms will focus on the opposite motion. Your physician, physical therapist or  will help you determine which exercises will be most helpful to resolve your low back pain. Do not complete any exercises without first consulting with your caregiver. Discontinue any exercises that make your symptoms worse, until you speak to your caregiver. If you have pain, numbness or tingling that travels down into your buttocks, leg or foot, the goal of this therapy is for these symptoms to move closer to your back and to eventually go away. Sometimes, these leg symptoms will get better, but your low back pain may get worse. This is typically an indication of progress in your rehabilitation. Be sure to be very alert to any changes in your symptoms and to the activities you have done in the 24 hours prior to the change. Sharing this information with your caregiver will allow him or her to best treat your condition.  These exercises may help you when beginning to rehabilitate your injury. Your symptoms may go away with or without further involvement from your physician, physical therapist or . While completing these exercises, remember:   · Restoring tissue flexibility helps normal motion to return to the joints. This allows healthier, less painful movement and activity.  · An effective stretch should be held for at least 30 seconds.  · A stretch should never be painful. You should only feel a gentle lengthening or release in the stretched tissue.  FLEXION RANGE OF MOTION AND STRETCHING EXERCISES:  STRETCH - Flexion, Single Knee to Chest  · Lie on a firm bed or floor, with both legs extended in front of you.  · Keeping one leg in contact with the floor, bring your  opposite knee to your chest. Hold your leg in place by either grabbing behind your thigh or at your knee.  · Pull until you feel a gentle stretch in your low back. Hold for __________ seconds.  · Slowly release your grasp and repeat the exercise with the opposite side.  Repeat __________ times. Complete this exercise __________ times per day.   STRETCH - Flexion, Double Knee to Chest   · Lie on a firm bed or floor, with both legs extended in front of you.  · Keeping one leg in contact with the floor, bring your opposite knee to your chest.  · Tense your stomach muscles to support your back and then lift your other knee to your chest. Hold your legs in place by either grabbing behind your thighs or at your knees.  · Pull both knees toward your chest until you feel a gentle stretch in your low back. Hold for __________ seconds.  · Tense your stomach muscles and slowly return one leg at a time to the floor.  Repeat __________ times. Complete this exercise __________ times per day.   STRETCH - Low Trunk Rotation  · Lie on a firm bed or floor. Keeping your legs in front of you, bend your knees so they are both pointed toward the ceiling and your feet are flat on the floor.  · Extend your arms out to the side. This will stabilize your upper body by keeping your shoulders in contact with the floor.  · Gently and slowly drop both knees together to one side, until you feel a gentle stretch in your low back. Hold for __________ seconds.  · Tense your stomach muscles to support your low back as you bring your knees back to the starting position. Repeat the exercise while dropping both knees to the other side.  Repeat __________ times. Complete this exercise __________ times per day   EXTENSION RANGE OF MOTION AND FLEXIBILITY EXERCISES:  STRETCH - Extension, Prone on Elbows   · Lie on your stomach on the floor. (A bed will be too soft.) Place your palms about shoulder width apart.  · Place your elbows under your shoulders. If  "this is too painful, stack pillows under your chest.  · Allow your body to relax so that your hips drop lower and make contact more completely with the floor.  · Hold this position for __________ seconds.  · Slowly return to lying flat on the floor.  Repeat __________ times. Complete this exercise __________ times per day.   RANGE OF MOTION - Extension, Prone Press Ups   · Lie on your stomach on the floor. (A bed will be too soft.) Place your palms about shoulder width apart and at the height of your head.  · Keeping your back as relaxed as possible, slowly straighten your elbows while keeping your hips on the floor. You may adjust the placement of your hands to maximize your comfort. As you gain motion, your hands will come more underneath your shoulders.  · Hold this position for __________ seconds.  · Slowly return to lying flat on the floor.  Repeat __________ times. Complete this exercise __________ times per day.   RANGE OF MOTION- Quadruped, Neutral Spine   · Assume a hands and knees position on a firm surface. Keep your hands under your shoulders and your knees under your hips. You may place padding under your knees for comfort.  · Drop your head and point your tail bone toward the ground below you. This will round out your low back like an angry cat. Hold this position for __________ seconds.  · Slowly lift your head and release your tail bone so that your back sags into a large arch, like an old horse.  · Hold this position for __________ seconds.  · Repeat this until you feel limber in your low back.  · Now, find your \"sweet spot.\" This will be the most comfortable position somewhere between the two previous positions. This is your neutral spine. Once you have found this position, tense your stomach muscles to support your low back.  · Hold this position for __________ seconds.  Repeat __________ times. Complete this exercise __________ times per day.   STRENGTHENING EXERCISES - Herniated Disk (Ruptured " "Disk)  These exercises may help you when beginning to rehabilitate your injury. These exercises should be done near your \"sweet spot.\" This is the neutral, low-back arch, somewhere between fully rounded and fully arched, that is your least painful position. When performed in this safe range of motion, these exercises can be used for people who have either a flexion or extension based injury. These exercises may resolve your symptoms with or without further involvement from your physician, physical therapist or . While completing these exercises, remember:   · Muscles can gain both the endurance and the strength needed for everyday activities through controlled exercises.  · Complete these exercises as instructed by your physician, physical therapist or . Increase the resistance and repetitions only as guided.  · You may experience muscle soreness or fatigue, but the pain or discomfort you are trying to eliminate should never worsen during these exercises. If this pain does get worse, stop and make sure you are following the directions exactly. If the pain is still present after adjustments, discontinue the exercise until you can discuss the trouble with your clinician.  STRENGTHENING - Deep Abdominals, Pelvic Tilt   · Lie on a firm bed or floor. Keeping your legs in front of you, bend your knees so they are both pointed toward the ceiling and your feet are flat on the floor.  · Tense your lower abdominal muscles to press your low back into the floor. This motion will rotate your pelvis so that your tail bone is scooping upwards rather than pointing at your feet or into the floor.  · With a gentle tension and even breathing, hold this position for __________ seconds.  Repeat __________ times. Complete this exercise __________ times per day.   STRENGTHENING - Abdominals, Crunches   · Lie on a firm bed or floor. Keeping your legs in front of you, bend your knees so they are both pointed " toward the ceiling and your feet are flat on the floor. Cross your arms over your chest.  · Slightly tip your chin down without bending your neck.  · Tense your abdominals and slowly lift your trunk high enough so that your shoulder blades are just off the floor. Lifting higher can put too much stress on the low back and does not further strengthen your abdominal muscles.  · With control, return to the starting position.  Repeat __________ times. Complete this exercise __________ times per day.   STRENGTHENING - Quadruped, Opposite UE/LE Lift  · Assume a hands and knees position on a firm surface. Keep your hands under your shoulders and your knees under your hips. You may place padding under your knees for comfort.  · Find your neutral spine and gently tense your abdominal muscles so that you can maintain this position. Your shoulders and hips should form a rectangle that is parallel with the floor and is not twisted.  · Keeping your trunk steady, lift your right hand no higher than your shoulder. Then lift your left leg no higher than your hip. Make sure you are not holding your breath. Hold this position for __________ seconds.  · Continuing to keep your abdominal muscles tense and your back steady, slowly return to your starting position. Repeat with the opposite arm and leg.  Repeat __________ times. Complete this exercise __________ times per day.   STRENGTHENING - Lower Abdominals, Double Knee Lift  · Lie on a firm bed or floor. Keeping your legs in front of you, bend your knees so they are both pointed toward the ceiling and your feet are flat on the floor.  · Tense your abdominal muscles to brace your low back and slowly lift both of your knees until they come over your hips. Be certain not to hold your breath.  · Hold for __________ seconds. Using your abdominal muscles, return to the starting position in a slow and controlled manner.  Repeat __________ times. Complete this exercise __________ times per  day.   POSTURE AND BODY MECHANICS CONSIDERATIONS - Herniated Disc (Ruptured Disk)  Keeping correct posture when sitting, standing or completing your activities will reduce the stress put on different body tissues, allowing injured tissues a chance to heal and limiting painful experiences. The following are general guidelines for improved posture. Your physician or physical therapist will provide you with any instructions specific to your needs. While reading these guidelines, remember:  · The exercises prescribed by your provider will help you build the flexibility and strength to maintain correct postures.  · The correct posture provides the best environment for your joints to work. All of your joints have less wear and tear when properly supported by a spine with good posture. This means you will experience a healthier, less painful body.  · Correct posture must be practiced with all of your activities, especially prolonged sitting and standing. Correct posture is as important when doing repetitive low-stress activities (typing) as it is when doing a single heavy-load activity (lifting).  RESTING POSITIONS  Consider which positions are most painful for you when choosing a resting position. If you have pain with flexion-based activities (sitting, bending, stooping, squatting), choose a position that allows you to rest in a less flexed posture. You would want to avoid curling into a fetal position on your side. If your pain gets worse with extension-based activities (prolonged standing, working overhead), avoid resting in an extended position such as sleeping on your stomach. Most people will find more comfort when they rest with their spine in a more neutral position, neither too rounded nor too arched. Lying on a non-sagging bed on your side, with a pillow between your knees, or on your back with a pillow under your knees will often provide some relief. Keep in mind, being in any one position for a prolonged  period of time, no matter how correct your posture, can still lead to stiffness.  PROPER SITTING POSTURE  In order to minimize stress and discomfort on your spine, you must sit with correct posture. Sitting with good posture should be effortless for a healthy body. Returning to good posture is a gradual process. Many people can work toward this most comfortably by using various supports until they have the flexibility and strength to maintain this posture on their own.  When sitting with proper posture, your ears will fall over your shoulders and your shoulders will fall over your hips. You should use the back of the chair to support your upper back. Your low back will be in a neutral position, just slightly arched. You may place a small pillow or folded towel at the base of your low back for support.   When working at a desk, create an environment that supports good, upright posture. Without extra support, muscles tire, which leads to excessive strain on joints and other tissues. Keep these recommendations in mind.  CHAIR  · A chair should be able to slide under your desk when your back makes contact with the back of the chair. This allows you to work closely.  · The chair's height should allow your eyes to be level with the upper part of your monitor and your hands to be slightly lower than your elbows.  BODY POSITION  · Your feet should make contact with the floor. If this is not possible, use a foot rest.  · Keep your ears over your shoulders. This will reduce stress on your neck and low back.  INCORRECT SITTING POSTURES   If you are feeling tired and unable to assume a healthy sitting posture, do not slouch or slump. This puts excessive strain on your back tissues, causing more damage and pain. Healthier options include:  · Using more support, like a lumbar pillow.  · Switching tasks, to something that requires you to be upright or walking.  · Talking a brief walk.  · Lying down to rest in a neutral-spine  position.  PROLONGED STANDING WHILE SLIGHTLY LEANING FORWARD   When completing a task that requires you to lean forward while standing in one place for a long time, place either foot up on a stationary 2-4 inch high object, to help maintain the best posture. When both feet are on the ground, the low back tends to lose its slight inward curve. If this curve flattens (or becomes too large), the back and your other joints will experience too much stress, tire more quickly and can cause pain.  CORRECT STANDING POSTURES  Proper standing posture should be assumed with all daily activities, even if they only take a few moments, like when brushing your teeth. As in sitting, your ears should fall over your shoulders and your shoulders should fall over your hips. You should keep a slight tension in your abdominal muscles to brace your spine. Your tailbone should point down to the ground, not behind your body, resulting in an over-extended swayback posture.   INCORRECT STANDING POSTURES   Common incorrect standing postures include a forward head, locked knees or an excessive swayback.  WALKING  Walk with an upright posture. Your ears, shoulders and hips should all line-up.  PROLONGED ACTIVITY IN A FLEXED POSITION  When completing a task that requires you to bend forward at your waist or lean over a low surface, try finding a way to stabilize 3 out of 4 of your limbs. You can place a hand or elbow on your thigh, or rest a knee on the surface you are reaching across. This will provide you more stability so that your muscles do not tire as quickly. By keeping your knees relaxed, or slightly bent, you will also reduce stress across your low back.  CORRECT LIFTING TECHNIQUES  DO :   · Assume a wide stance. This will provide you more stability and the opportunity to get as close as possible to the object you are lifting.  · Tense your abdominals to brace your spine. Then, bend at the knees and hips. Keeping your back locked in a  neutral-spine position, lift using your leg muscles. Lift with your legs, keeping your back straight.  · Test the weight of unknown objects before attempting to lift them.  · Try to keep your elbows down by your sides, in order get the best strength from your shoulders when carrying an object.  · Always ask for help when lifting heavy or awkward objects.  INCORRECT LIFTING TECHNIQUES  DO NOT:   · Lock your knees when lifting, even if it is a small object.  · Bend and twist. Pivot at your feet or move your feet when needing to change directions.  · Assume that you can safely  even a paper clip, without proper posture.     This information is not intended to replace advice given to you by your health care provider. Make sure you discuss any questions you have with your health care provider.     Document Released: 2006 Document Revised: 2016 Document Reviewed: 2010  "[employer] Interactive Patient Education © "[employer] Inc[employer]      If you have any questions in regards to your visit with  today, please do not hesitate to contact our office. Ask to speak with a [facility] for any clinical questions you may have.    Sarah Sparrow [facility]    [phone]      Patient Instructions History      Upcoming Appointments     Visit Type Date Time Department    NEW PATIENT 2017  1:15 PM [facility]      [facility] Signup     [facility] allows you to send messages to your doctor, view your test results, renew your prescriptions, schedule appointments, and more. To sign up, go to [employer] and click on the Sign Up Now link in the New User? box. Enter your [facility]Lab Activation Code exactly as it appears below along with the last four digits of your Social Security Number and your Date of Birth () to complete the sign-up process. If you do not sign up before the expiration date, you must request a new code.    [facility] Activation Code: [email or web address]  Expires: 2017   2:11 PM    If you have questions, you can email [email or web address] or call [phone] to talk to our [email or web address] staff. Remember, [employer] is NOT to be used for urgent needs. For medical emergencies, dial 911.               Other Info from Your Visit           Allergies     Penicillins        Reason for Visit     Back Pain LBP     Leg Pain Bi       Vital Signs     Smoking Status                   Never Smoker           Problems and Diagnoses Noted     Degeneration of lumbar or lumbosacral intervertebral disc    -  Primary    Lumbar disc herniation